# Patient Record
Sex: FEMALE | Race: BLACK OR AFRICAN AMERICAN | Employment: FULL TIME | URBAN - METROPOLITAN AREA
[De-identification: names, ages, dates, MRNs, and addresses within clinical notes are randomized per-mention and may not be internally consistent; named-entity substitution may affect disease eponyms.]

---

## 2017-01-09 PROBLEM — R73.03 PREDIABETES: Status: ACTIVE | Noted: 2017-01-09

## 2017-02-17 ENCOUNTER — HOSPITAL ENCOUNTER (OUTPATIENT)
Dept: ULTRASOUND IMAGING | Age: 37
Discharge: HOME | End: 2017-02-17
Attending: PHYSICIAN ASSISTANT
Payer: COMMERCIAL

## 2017-02-17 ENCOUNTER — HOSPITAL ENCOUNTER (OUTPATIENT)
Dept: MAMMOGRAPHY | Age: 37
Discharge: HOME | End: 2017-02-17
Attending: PHYSICIAN ASSISTANT
Payer: COMMERCIAL

## 2017-02-17 DIAGNOSIS — N64.4 BREAST PAIN: ICD-10-CM

## 2017-02-17 PROCEDURE — 76642 ULTRASOUND BREAST LIMITED: CPT

## 2017-02-17 PROCEDURE — 77062 BREAST TOMOSYNTHESIS BI: CPT

## 2017-11-20 ENCOUNTER — OFFICE VISIT (OUTPATIENT)
Dept: INTERNAL MEDICINE CLINIC | Age: 37
End: 2017-11-20

## 2017-11-20 VITALS
OXYGEN SATURATION: 99 % | SYSTOLIC BLOOD PRESSURE: 124 MMHG | HEIGHT: 66 IN | WEIGHT: 139.4 LBS | HEART RATE: 81 BPM | BODY MASS INDEX: 22.4 KG/M2 | DIASTOLIC BLOOD PRESSURE: 82 MMHG | RESPIRATION RATE: 14 BRPM | TEMPERATURE: 98.1 F

## 2017-11-20 DIAGNOSIS — H92.01 EAR PAIN, REFERRED, RIGHT: Primary | ICD-10-CM

## 2017-11-20 RX ORDER — CETIRIZINE HCL 10 MG
TABLET ORAL
COMMUNITY

## 2017-11-20 RX ORDER — AZITHROMYCIN 250 MG/1
TABLET, FILM COATED ORAL
Qty: 6 TAB | Refills: 0 | Status: CANCELLED | OUTPATIENT
Start: 2017-11-20 | End: 2017-11-25

## 2017-11-20 NOTE — PROGRESS NOTES
1. Have you been to the ER, urgent care clinic or hospitalized since your last visit? NO.     2. Have you seen or consulted any other health care providers outside of the [address] [place] [address] since your last visit (Include any pap smears or colon screening)? NO      Do you have an Advanced Directive? NO    Would you like information on Advanced Directives?  NO

## 2017-11-20 NOTE — PROGRESS NOTES
Tanvir Edgar is a 30 y.o.  female and presents with    Chief Complaint   Patient presents with    Head Pain     Patient here for head pain and right ear pain. Patient stated that it feels like something is in her ear. x 2 weeks for ear and head pain x 1 day        Subjective:  HPI   Mrs. Alejandro Monsalve presents today with complaints of right sided ear pain with intermittent headaches that seem worse today. Feels as though something is in the ear. She has tried Debrox but felt the ear pain was worse so she stopped. She has been taking Zyrtec daily, she did not take it over the weekend. Additional Concerns: none     ROS   Review of Systems   Constitutional: Negative for chills, fever and malaise/fatigue. HENT: Positive for ear pain. Negative for congestion, ear discharge, hearing loss, sinus pain, sore throat and tinnitus. Eyes: Negative. Respiratory: Negative. Cardiovascular: Negative. Gastrointestinal: Negative. Skin: Negative for itching and rash. Neurological: Positive for headaches. Negative for dizziness. Allergies   Allergen Reactions    Pcn [Penicillins] Anaphylaxis       Current Outpatient Prescriptions   Medication Sig Dispense Refill    cetirizine (ZYRTEC) 10 mg tablet Take  by mouth. Social History     Social History    Marital status:      Spouse name: N/A    Number of children: N/A    Years of education: N/A     Occupational History    Not on file. Social History Main Topics    Smoking status: Never Smoker    Smokeless tobacco: Never Used    Alcohol use Not on file    Drug use: Not on file    Sexual activity: Not on file     Other Topics Concern    Not on file     Social History Narrative       No past medical history on file. No past surgical history on file. No family history on file.     Objective:  Vitals:    11/20/17 1447   BP: 124/82   Pulse: 81   Resp: 14   Temp: 98.1 °F (36.7 °C)   TempSrc: Oral   SpO2: 99%   Weight: 139 lb 6.4 oz (63.2 kg)   Height: 5' 6\" (1.676 m)   PainSc:   4   PainLoc: Ear   LMP: 11/16/2017       LABS   Results for orders placed or performed during the hospital encounter of 11/11/15   HEP B SURFACE AB   Result Value Ref Range    Hep B surface Ab Interp. DETECTED (A) POS      Hep B surface Ab comment        Samples with a  value of 10 mIU/mL or greater are considered positive (protective immunity) in accordance with the CDC guidelines. MUMPS AB, IGG   Result Value Ref Range    Mumps Abs, IgG 173.0 Immune >10.9 AU/mL   RUBELLA AB, IGG   Result Value Ref Range    Rubella IgG, QL IMMUNE IMM     RUBEOLA AB, IGG   Result Value Ref Range    Rubeola Ab, IgG 138.0 Immune >29.9 AU/mL   VZV AB, IGG   Result Value Ref Range    V. zoster, IgG 1751 Immune >165 index       TESTS  none    PE  Physical Exam   Constitutional: She is oriented to person, place, and time. She appears well-developed and well-nourished. No distress. HENT:   Head: Normocephalic and atraumatic. Nose: Nose normal.   Mouth/Throat: Oropharynx is clear and moist. No oropharyngeal exudate. After ear lavage TM right ear visible with dark fluid behind the ear, TM left ear visible with mild erythema to external canal, non infectious   Eyes: Conjunctivae and EOM are normal. Pupils are equal, round, and reactive to light. Neck: Normal range of motion. Cardiovascular: Normal heart sounds. Lymphadenopathy:     She has no cervical adenopathy. Neurological: She is alert and oriented to person, place, and time. Skin: Skin is warm and dry. She is not diaphoretic. Psychiatric: She has a normal mood and affect. Her behavior is normal. Judgment and thought content normal.       Assessment/Plan:    1. Right ear pain- Cerumen impaction- ear lavage completed with relief, pt reports improved hearing. Noninfectious dark fluid noted behind the right TM, Zyrtec daily to BID, Flonase before sleep. Follow up as needed.      Lab review: no lab studies available for review at time of visit    Today's Visit:   Diagnoses and all orders for this visit:    1. Ear pain, referred, right      Health Maintenance: not addressed today    I have discussed the diagnosis with the patient and the intended plan as seen in the above orders. The patient has received an after-visit summary and questions were answered concerning future plans. I have discussed medication side effects and warnings with the patient as well. I have reviewed the plan of care with the patient, accepted their input and they are in agreement with the treatment goals. Follow-up Disposition: Not on File   More than 1/2 of this 15 minute visit was spent in counseling and coordination of care, as described above.     LEWIS Cordero  Internist of [address], [facility].  Phone: [phone]  Fax: [phone]

## 2017-12-07 ENCOUNTER — TELEPHONE (OUTPATIENT)
Dept: INTERNAL MEDICINE CLINIC | Age: 37
End: 2017-12-07

## 2017-12-07 ENCOUNTER — OFFICE VISIT (OUTPATIENT)
Dept: INTERNAL MEDICINE CLINIC | Age: 37
End: 2017-12-07

## 2017-12-07 ENCOUNTER — HOSPITAL ENCOUNTER (OUTPATIENT)
Dept: LAB | Age: 37
Discharge: HOME | End: 2017-12-07
Payer: COMMERCIAL

## 2017-12-07 VITALS
OXYGEN SATURATION: 99 % | RESPIRATION RATE: 14 BRPM | HEIGHT: 66 IN | SYSTOLIC BLOOD PRESSURE: 132 MMHG | TEMPERATURE: 98 F | BODY MASS INDEX: 22.63 KG/M2 | WEIGHT: 140.8 LBS | HEART RATE: 100 BPM | DIASTOLIC BLOOD PRESSURE: 70 MMHG

## 2017-12-07 DIAGNOSIS — Z86.59 HISTORY OF PANIC ATTACKS: ICD-10-CM

## 2017-12-07 DIAGNOSIS — Z86.39 HISTORY OF VITAMIN D DEFICIENCY: ICD-10-CM

## 2017-12-07 DIAGNOSIS — Z23 ENCOUNTER FOR IMMUNIZATION: ICD-10-CM

## 2017-12-07 DIAGNOSIS — Z00.00 ROUTINE GENERAL MEDICAL EXAMINATION AT A HEALTH CARE FACILITY: ICD-10-CM

## 2017-12-07 DIAGNOSIS — F41.1 GENERALIZED ANXIETY DISORDER: Primary | ICD-10-CM

## 2017-12-07 LAB
ALBUMIN SERPL-MCNC: 4 G/DL (ref 3.4–5)
ALBUMIN/GLOB SERPL: 1 {RATIO} (ref 0.8–1.7)
ALP SERPL-CCNC: 66 U/L (ref 45–117)
ALT SERPL-CCNC: 16 U/L (ref 13–56)
ANION GAP SERPL CALC-SCNC: 6 MMOL/L (ref 3–18)
APPEARANCE UR: CLEAR
AST SERPL-CCNC: 10 U/L (ref 15–37)
BACTERIA URNS QL MICRO: ABNORMAL /HPF
BASOPHILS # BLD: 0 K/UL (ref 0–0.06)
BASOPHILS NFR BLD: 0 % (ref 0–2)
BILIRUB SERPL-MCNC: 0.6 MG/DL (ref 0.2–1)
BILIRUB UR QL: NEGATIVE
BUN SERPL-MCNC: 7 MG/DL (ref 7–18)
BUN/CREAT SERPL: 8 (ref 12–20)
CALCIUM SERPL-MCNC: 8.9 MG/DL (ref 8.5–10.1)
CHLORIDE SERPL-SCNC: 105 MMOL/L (ref 100–108)
CO2 SERPL-SCNC: 27 MMOL/L (ref 21–32)
COLOR UR: YELLOW
CREAT SERPL-MCNC: 0.87 MG/DL (ref 0.6–1.3)
DIFFERENTIAL METHOD BLD: NORMAL
EOSINOPHIL # BLD: 0.1 K/UL (ref 0–0.4)
EOSINOPHIL NFR BLD: 1 % (ref 0–5)
EPITH CASTS URNS QL MICRO: ABNORMAL /LPF (ref 0–5)
ERYTHROCYTE [DISTWIDTH] IN BLOOD BY AUTOMATED COUNT: 13.4 % (ref 11.6–14.5)
GLOBULIN SER CALC-MCNC: 3.9 G/DL (ref 2–4)
GLUCOSE SERPL-MCNC: 88 MG/DL (ref 74–99)
GLUCOSE UR STRIP.AUTO-MCNC: NEGATIVE MG/DL
HCT VFR BLD AUTO: 39.8 % (ref 35–45)
HGB BLD-MCNC: 12.9 G/DL (ref 12–16)
HGB UR QL STRIP: NEGATIVE
KETONES UR QL STRIP.AUTO: NEGATIVE MG/DL
LEUKOCYTE ESTERASE UR QL STRIP.AUTO: ABNORMAL
LYMPHOCYTES # BLD: 1.8 K/UL (ref 0.9–3.6)
LYMPHOCYTES NFR BLD: 21 % (ref 21–52)
MCH RBC QN AUTO: 25.9 PG (ref 24–34)
MCHC RBC AUTO-ENTMCNC: 32.4 G/DL (ref 31–37)
MCV RBC AUTO: 79.8 FL (ref 74–97)
MONOCYTES # BLD: 0.6 K/UL (ref 0.05–1.2)
MONOCYTES NFR BLD: 6 % (ref 3–10)
NEUTS SEG # BLD: 6.4 K/UL (ref 1.8–8)
NEUTS SEG NFR BLD: 72 % (ref 40–73)
NITRITE UR QL STRIP.AUTO: NEGATIVE
PH UR STRIP: 6 [PH] (ref 5–8)
PLATELET # BLD AUTO: 265 K/UL (ref 135–420)
PMV BLD AUTO: 11.6 FL (ref 9.2–11.8)
POTASSIUM SERPL-SCNC: 4.1 MMOL/L (ref 3.5–5.5)
PROT SERPL-MCNC: 7.9 G/DL (ref 6.4–8.2)
PROT UR STRIP-MCNC: NEGATIVE MG/DL
RBC # BLD AUTO: 4.99 M/UL (ref 4.2–5.3)
RBC #/AREA URNS HPF: ABNORMAL /HPF (ref 0–5)
SODIUM SERPL-SCNC: 138 MMOL/L (ref 136–145)
SP GR UR REFRACTOMETRY: 1.01 (ref 1–1.03)
TSH SERPL DL<=0.05 MIU/L-ACNC: 0.91 UIU/ML (ref 0.36–3.74)
UROBILINOGEN UR QL STRIP.AUTO: 0.2 EU/DL (ref 0.2–1)
WBC # BLD AUTO: 8.9 K/UL (ref 4.6–13.2)
WBC URNS QL MICRO: ABNORMAL /HPF (ref 0–4)

## 2017-12-07 PROCEDURE — 80053 COMPREHEN METABOLIC PANEL: CPT | Performed by: NURSE PRACTITIONER

## 2017-12-07 PROCEDURE — 81001 URINALYSIS AUTO W/SCOPE: CPT | Performed by: NURSE PRACTITIONER

## 2017-12-07 PROCEDURE — 84443 ASSAY THYROID STIM HORMONE: CPT | Performed by: NURSE PRACTITIONER

## 2017-12-07 PROCEDURE — 85025 COMPLETE CBC W/AUTO DIFF WBC: CPT | Performed by: NURSE PRACTITIONER

## 2017-12-07 PROCEDURE — 82306 VITAMIN D 25 HYDROXY: CPT | Performed by: NURSE PRACTITIONER

## 2017-12-07 RX ORDER — ALPRAZOLAM 0.25 MG/1
TABLET ORAL
COMMUNITY
End: 2017-12-07 | Stop reason: SDUPTHER

## 2017-12-07 RX ORDER — ALPRAZOLAM 0.25 MG/1
0.25 TABLET ORAL
Qty: 30 TAB | Refills: 0 | OUTPATIENT
Start: 2017-12-07 | End: 2019-06-05 | Stop reason: SDUPTHER

## 2017-12-07 NOTE — PATIENT INSTRUCTIONS
Fluticasone (Into the nose)   Fluticasone ([id number])  Treats allergy symptoms, such as runny or stuffy nose. This medicine is a corticosteroid. Brand Name(s): Children's Flonase, ClariSpray, DermacinRx TrustedAd, DermacinRx [place], [facility], Flonase Sensimist, Fluticasone Propionate Novaplus, Ticaspray, Veramyst   There may be other brand names for this medicine. When This Medicine Should Not Be Used: This medicine is not right for everyone. Do not use if you had an allergic reaction to fluticasone. How to Use This Medicine:   Spray  · Your doctor will tell you how much medicine to use. Do not use more than directed. · This medicine is for use only in the nose. Do not get any of it in your eyes or on your skin. If it does get on these areas, rinse it off right away. · Prime the spray: Release 6 test sprays into the air away from the face, or pump the bottle until some of the medicine sprays out. Now it is ready to use. Prime the spray if it has not been used for more than 7 days (or 30 days for Veramyst®) or if the cap has been left off the bottle for 5 days or longer. · Shake the medicine well just before each use. · Before using the medicine, gently blow your nose to clear the nostrils. · After using the nasal spray, wipe the tip of the bottle with a clean tissue and put the cap back on. · You may need to use this medicine for a few days before you start to feel better. · Read and follow the patient instructions that come with this medicine. Talk to your doctor or pharmacist if you have any questions. · Follow the instructions on the medicine label if you are using this medicine without a prescription. · Missed dose: Take a dose as soon as you remember. If it is almost time for your next dose, wait until then and take a regular dose. Do not take extra medicine to make up for a missed dose. · Keep the bottle tightly closed when not using it.  Store at room temperature, away from heat and direct light. Do not freeze or refrigerate. Throw this medicine away after you use 120 sprays. Drugs and Foods to Avoid:   Ask your doctor or pharmacist before using any other medicine, including over-the-counter medicines, vitamins, and herbal products. · Do not use this medicine together with ritonavir. · Some foods and medicines can affect how fluticasone works. Tell your doctor if you are using ketoconazole. Warnings While Using This Medicine:   · Tell your doctor if you are pregnant or breastfeeding, or if you have liver disease, asthma, an infection, or a history of cataracts or glaucoma. Make sure your doctor knows if you have had nose surgery, a nose injury, or a recent infection in your nose. · This medicine may cause the following problems:  ¨ Holes or ulcers inside the nose  ¨ Slow wound healing  ¨ Cataracts or glaucoma  ¨ Problems with the adrenal glands  ¨ Slow growth in children  · Avoid people who are sick or have infections. Tell your doctor right away if you think you have been exposed to measles or chickenpox. · Your doctor will check your progress and the effects of this medicine at regular visits. Keep all appointments. · Call your doctor if your symptoms do not improve or if they get worse. · Keep all medicine out of the reach of children. Never share your medicine with anyone.   Possible Side Effects While Using This Medicine:   Call your doctor right away if you notice any of these side effects:  · Allergic reaction: Itching or hives, swelling in your face or hands, swelling or tingling in your mouth or throat, chest tightness, trouble breathing  · Burning, redness, swelling, or irritation around or inside your nose  · Eye pain or vision changes  · Fever, chills, cough, sore throat, and body aches  · Heavy nosebleeds  · Sores or white patches inside the nose or mouth  · Tiredness, weakness, dizziness  If you notice other side effects that you think are caused by this medicine, tell your doctor. Call your doctor for medical advice about side effects. You may report side effects to FDA at 4-584-FDA-6229  © 2017 [address] [employer] Information is for End User's use only and may not be sold, redistributed or otherwise used for commercial purposes. The above information is an  only. It is not intended as medical advice for individual conditions or treatments. Talk to your doctor, nurse or pharmacist before following any medical regimen to see if it is safe and effective for you. Anxiety Disorder: Care Instructions  Your Care Instructions    Anxiety is a normal reaction to stress. Difficult situations can cause you to have symptoms such as sweaty palms and a nervous feeling. In an anxiety disorder, the symptoms are far more severe. Constant worry, muscle tension, trouble sleeping, nausea and diarrhea, and other symptoms can make normal daily activities difficult or impossible. These symptoms may occur for no reason, and they can affect your work, school, or social life. Medicines, counseling, and self-care can all help. Follow-up care is a key part of your treatment and safety. Be sure to make and go to all appointments, and call your doctor if you are having problems. It's also a good idea to know your test results and keep a list of the medicines you take. How can you care for yourself at home? · Take medicines exactly as directed. Call your doctor if you think you are having a problem with your medicine. · Go to your counseling sessions and follow-up appointments. · Recognize and accept your anxiety. Then, when you are in a situation that makes you anxious, say to yourself, \"This is not an emergency. I feel uncomfortable, but I am not in danger. I can keep going even if I feel anxious. \"  · Be kind to your body:  ¨ Relieve tension with exercise or a massage. ¨ Get enough rest.  ¨ Avoid alcohol, caffeine, nicotine, and illegal drugs.  They can increase your anxiety level and cause sleep problems. ¨ Learn and do relaxation techniques. See below for more about these techniques. · Engage your mind. Get out and do something you enjoy. Go to a funny movie, or take a walk or hike. Plan your day. Having too much or too little to do can make you anxious. · Keep a record of your symptoms. Discuss your fears with a good friend or family member, or join a support group for people with similar problems. Talking to others sometimes relieves stress. · Get involved in social groups, or volunteer to help others. Being alone sometimes makes things seem worse than they are. · Get at least 30 minutes of exercise on most days of the week to relieve stress. Walking is a good choice. You also may want to do other activities, such as running, swimming, cycling, or playing tennis or team sports. Relaxation techniques  Do relaxation exercises 10 to 20 minutes a day. You can play soothing, relaxing music while you do them, if you wish. · Tell others in your house that you are going to do your relaxation exercises. Ask them not to disturb you. · Find a comfortable place, away from all distractions and noise. · Lie down on your back, or sit with your back straight. · Focus on your breathing. Make it slow and steady. · Breathe in through your nose. Breathe out through either your nose or mouth. · Breathe deeply, filling up the area between your navel and your rib cage. Breathe so that your belly goes up and down. · Do not hold your breath. · Breathe like this for 5 to 10 minutes. Notice the feeling of calmness throughout your whole body. As you continue to breathe slowly and deeply, relax by doing the following for another 5 to 10 minutes:  · Tighten and relax each muscle group in your body. You can begin at your toes and work your way up to your head. · Imagine your muscle groups relaxing and becoming heavy. · Empty your mind of all thoughts.   · Let yourself relax more and more deeply. · Become aware of the state of calmness that surrounds you. · When your relaxation time is over, you can bring yourself back to alertness by moving your fingers and toes and then your hands and feet and then stretching and moving your entire body. Sometimes people fall asleep during relaxation, but they usually wake up shortly afterward. · Always give yourself time to return to full alertness before you drive a car or do anything that might cause an accident if you are not fully alert. Never play a relaxation tape while you drive a car. When should you call for help? Call 911 anytime you think you may need emergency care. For example, call if:  ? · You feel you cannot stop from hurting yourself or someone else. ? Keep the numbers for these national suicide hotlines: 0-137-139-TALK (4-716.482.5195) and 1-377-JUBHYOJ (4-566.564.6629). If you or someone you know talks about suicide or feeling hopeless, get help right away. ? Watch closely for changes in your health, and be sure to contact your doctor if:  ? · You have anxiety or fear that affects your life. ? · You have symptoms of anxiety that are new or different from those you had before. Where can you learn more? Go to http://carlos manuel-joan.info/. Enter P754 in the search box to learn more about \"Anxiety Disorder: Care Instructions. \"  Current as of: May 12, 2017  Content Version: 11.4  © 1610-9674 [employer]. Care instructions adapted under license by [employer] (which disclaims liability or warranty for this information). If you have questions about a medical condition or this instruction, always ask your healthcare professional. [place] [address] any warranty or liability for your use of this information.

## 2017-12-07 NOTE — PROGRESS NOTES
Ondina Ross is a 30 y.o.  female and presents with    Chief Complaint   Patient presents with    Physical     Patient here for physical        Subjective:  HPI   Mrs. Rayray Collier presents today to have a CPE and get a refill on her Xanax. History of anxiety. Anxiety  She is established with Psychology. Did not tolerate Lexapro and Zoloft in past, increased symptoms of anxiety. She needs a refill on Xanax today. She reports today recent personal life stressors. Elevated blood pressure  Significant FMH. May be related to anxiety. Additional Concerns: none     ROS   Review of Systems   Constitutional: Negative. HENT: Negative. Eyes: Negative. Respiratory: Negative. Cardiovascular: Negative. Gastrointestinal: Negative. Genitourinary: Negative. Musculoskeletal: Negative. Skin: Negative. Neurological: Negative. Endo/Heme/Allergies: Negative. Psychiatric/Behavioral: The patient is nervous/anxious. Allergies   Allergen Reactions    Pcn [Penicillins] Anaphylaxis       Current Outpatient Prescriptions   Medication Sig Dispense Refill    ALPRAZolam (XANAX) 0.25 mg tablet Take 1 Tab by mouth every eight (8) hours as needed for Anxiety. Max Daily Amount: 0.75 mg. 30 Tab 0    cetirizine (ZYRTEC) 10 mg tablet Take  by mouth. Social History     Social History    Marital status:      Spouse name: N/A    Number of children: N/A    Years of education: N/A     Occupational History    Not on file. Social History Main Topics    Smoking status: Never Smoker    Smokeless tobacco: Never Used    Alcohol use Yes      Comment: socially    Drug use: No    Sexual activity: Yes     Partners: Male     Birth control/ protection: None     Other Topics Concern    Not on file     Social History Narrative       History reviewed. No pertinent past medical history. History reviewed. No pertinent surgical history.     Family History   Problem Relation Age of Onset    Elevated Lipids Mother     Hypertension Mother     Heart Disease Father     Heart Disease Paternal Uncle     Diabetes Maternal Grandmother     Elevated Lipids Maternal Grandmother     Hypertension Maternal Grandmother     Diabetes Maternal Grandfather     Elevated Lipids Maternal Grandfather     Hypertension Maternal Grandfather     Heart Disease Maternal Grandfather        Objective:  Vitals:    12/07/17 1259   BP: 132/70   Pulse: 100   Resp: 14   Temp: 98 °F (36.7 °C)   TempSrc: Oral   SpO2: 99%   Weight: 140 lb 12.8 oz (63.9 kg)   Height: 5' 6\" (1.676 m)   PainSc:   0 - No pain   LMP: 11/16/2017       LABS   Results for orders placed or performed during the hospital encounter of 11/11/15   HEP B SURFACE AB   Result Value Ref Range    Hep B surface Ab Interp. DETECTED (A) POS      Hep B surface Ab comment        Samples with a  value of 10 mIU/mL or greater are considered positive (protective immunity) in accordance with the CDC guidelines. MUMPS AB, IGG   Result Value Ref Range    Mumps Abs, IgG 173.0 Immune >10.9 AU/mL   RUBELLA AB, IGG   Result Value Ref Range    Rubella IgG, QL IMMUNE IMM     RUBEOLA AB, IGG   Result Value Ref Range    Rubeola Ab, IgG 138.0 Immune >29.9 AU/mL   VZV AB, IGG   Result Value Ref Range    V. zoster, IgG 1751 Immune >165 index       TESTS  none    PE  Physical Exam   Constitutional: She is oriented to person, place, and time. She appears well-developed and well-nourished. No distress. HENT:   Head: Normocephalic and atraumatic. Right Ear: External ear normal.   Left Ear: External ear normal.   Nose: Nose normal.   Mouth/Throat: Oropharynx is clear and moist.   Eyes: Conjunctivae and EOM are normal. Pupils are equal, round, and reactive to light. Neck: Normal range of motion. Neck supple. No thyromegaly present. Cardiovascular: Normal rate, regular rhythm, normal heart sounds and intact distal pulses.     Pulmonary/Chest: Effort normal and breath sounds normal. No respiratory distress. She has no wheezes. She has no rales. She exhibits no tenderness. Abdominal: Soft. Bowel sounds are normal. She exhibits no distension. There is no tenderness. Musculoskeletal: Normal range of motion. Lymphadenopathy:     She has no cervical adenopathy. Neurological: She is alert and oriented to person, place, and time. No cranial nerve deficit. Skin: Skin is warm and dry. No rash noted. She is not diaphoretic. No erythema. No pallor. Psychiatric: She has a normal mood and affect. Her behavior is normal. Judgment and thought content normal.       Assessment/Plan:    1. CPE- completed today. Labs ordered. Will send to PCP. 2. Anxiety- Reported history of anxiety. DANIEL-7, PHQ-9-5, scanned into chart. Refilled Xanax script, phoned in. Follow up with PCP. Established with Psychology, has not had recent contact. 3. Elevated blood pressure- Significant FMH. Discussed new guidelines. Monitor salt intake. Patient is attempting healthy diet by making better food choices. She is not currently on an exercise regimen. Lab review: orders written for new lab studies as appropriate; see orders    Today's Visit:   Diagnoses and all orders for this visit:    1. Generalized anxiety disorder  -     ALPRAZolam (XANAX) 0.25 mg tablet; Take 1 Tab by mouth every eight (8) hours as needed for Anxiety. Max Daily Amount: 0.75 mg.    2. History of panic attacks  -     ALPRAZolam (XANAX) 0.25 mg tablet; Take 1 Tab by mouth every eight (8) hours as needed for Anxiety. Max Daily Amount: 0.75 mg.    3. Routine general medical examination at a health care facility  -     CBC WITH AUTOMATED DIFF; Future  -     METABOLIC PANEL, COMPREHENSIVE; Future  -     TSH 3RD GENERATION; Future  -     URINALYSIS W/MICROSCOPIC; Future    4. History of vitamin D deficiency  -     VITAMIN D, 25 HYDROXY; Future    5.  Encounter for immunization  -     Tetanus, diphtheria toxoids and acellular pertussis (TDAP) vaccine, in individuals >=7 years, IM      Health Maintenance:   Influenza up to date. Tdap vaccine given today. Pap- she is followed by Gynecologist    I have discussed the diagnosis with the patient and the intended plan as seen in the above orders. The patient has received an after-visit summary and questions were answered concerning future plans. I have discussed medication side effects and warnings with the patient as well. I have reviewed the plan of care with the patient, accepted their input and they are in agreement with the treatment goals. Follow-up Disposition:  Return if symptoms worsen or fail to improve. More than 1/2 of this 45 minute visit was spent in counseling and coordination of care, as described above.     LEWIS Caputo  Internist of [address], [address] [place] [address].  Phone: [phone]  Fax: [phone]

## 2017-12-07 NOTE — MR AVS SNAPSHOT
Visit Information Date & Time Provider Department Dept. Phone Encounter #  
 12/7/2017  1:00 PM Cassy Zimmer NP [facility] [phone] Follow-up Instructions Return if symptoms worsen or fail to improve. Upcoming Health Maintenance Date Due  
 PAP AKA CERVICAL CYTOLOGY 5/1/2019 DTaP/Tdap/Td series (2 - Td) 12/7/2027 Allergies as of 12/7/2017  Review Complete On: 12/7/2017 By: Cassy Zimmer NP Severity Noted Reaction Type Reactions Pcn [Penicillins] High 05/12/2016    Anaphylaxis Current Immunizations  Never Reviewed Name Date Tdap 12/7/2017 Not reviewed this visit You Were Diagnosed With   
  
 Codes Comments Generalized anxiety disorder    -  Primary ICD-10-CM: F41.1 ICD-9-CM: 300.02 History of panic attacks     ICD-10-CM: Z86.59 
ICD-9-CM: V11.8 Routine general medical examination at a health care facility     ICD-10-CM: Z00.00 ICD-9-CM: V70.0 History of vitamin D deficiency     ICD-10-CM: Z86.39 
ICD-9-CM: V12.1 Encounter for immunization     ICD-10-CM: S84 ICD-9-CM: V03.89 Vitals BP Pulse Temp Resp Height(growth percentile) Weight(growth percentile) 132/70 (BP 1 Location: Right arm, BP Patient Position: Sitting) 100 98 °F (36.7 °C) (Oral) 14 5' 6\" (1.676 m) 140 lb 12.8 oz (63.9 kg) LMP SpO2 BMI Smoking Status 11/16/2017 99% 22.73 kg/m2 Never Smoker Vitals History BMI and BSA Data Body Mass Index Body Surface Area  
 22.73 kg/m 2 1.72 m 2 [facility] [address] [facility] Your Updated Medication List  
  
   
This list is accurate as of: 12/7/17  2:17 PM.  Always use your most recent med list.  
  
  
  
  
 ALPRAZolam 0.25 mg tablet Commonly known as:  Veronica Chito Take 1 Tab by mouth every eight (8) hours as needed for Anxiety.  Max Daily Amount: 0.75 mg.  
  
 ZyrTEC 10 mg tablet Generic drug:  cetirizine Take  by mouth. We Performed the Following TETANUS, DIPHTHERIA TOXOIDS AND ACELLULAR PERTUSSIS VACCINE (TDAP), IN INDIVIDS. >=7, IM [id number] CPT(R)] Follow-up Instructions Return if symptoms worsen or fail to improve. To-Do List   
 12/07/2017 Lab:  CBC WITH AUTOMATED DIFF   
  
 12/07/2017 Lab:  METABOLIC PANEL, COMPREHENSIVE   
  
 12/07/2017 Lab:  TSH 3RD GENERATION   
  
 12/07/2017 Lab:  URINALYSIS W/MICROSCOPIC   
  
 12/07/2017 Lab:  VITAMIN D, 25 HYDROXY Patient Instructions Fluticasone (Into the nose) Fluticasone (zerl-KGF-j-sone) Treats allergy symptoms, such as runny or stuffy nose. This medicine is a corticosteroid. Brand Name(s): Children's Flonase, ClariSpray, DermacinRx [facility], DermacinRx [place], [facility], Flonase Sensimist, Fluticasone Propionate Novaplus, Ticaspray, Veramyst  
There may be other brand names for this medicine. When This Medicine Should Not Be Used: This medicine is not right for everyone. Do not use if you had an allergic reaction to fluticasone. How to Use This Medicine:  
Spray · Your doctor will tell you how much medicine to use. Do not use more than directed. · This medicine is for use only in the nose. Do not get any of it in your eyes or on your skin. If it does get on these areas, rinse it off right away. · Prime the spray: Release 6 test sprays into the air away from the face, or pump the bottle until some of the medicine sprays out. Now it is ready to use. Prime the spray if it has not been used for more than 7 days (or 30 days for Veramyst®) or if the cap has been left off the bottle for 5 days or longer. · Shake the medicine well just before each use. · Before using the medicine, gently blow your nose to clear the nostrils.  
· After using the nasal spray, wipe the tip of the bottle with a clean tissue and put the cap back on. · You may need to use this medicine for a few days before you start to feel better. · Read and follow the patient instructions that come with this medicine. Talk to your doctor or pharmacist if you have any questions. · Follow the instructions on the medicine label if you are using this medicine without a prescription. · Missed dose: Take a dose as soon as you remember. If it is almost time for your next dose, wait until then and take a regular dose. Do not take extra medicine to make up for a missed dose. · Keep the bottle tightly closed when not using it. Store at room temperature, away from heat and direct light. Do not freeze or refrigerate. Throw this medicine away after you use 120 sprays. Drugs and Foods to Avoid: Ask your doctor or pharmacist before using any other medicine, including over-the-counter medicines, vitamins, and herbal products. · Do not use this medicine together with ritonavir. · Some foods and medicines can affect how fluticasone works. Tell your doctor if you are using ketoconazole. Warnings While Using This Medicine: · Tell your doctor if you are pregnant or breastfeeding, or if you have liver disease, asthma, an infection, or a history of cataracts or glaucoma. Make sure your doctor knows if you have had nose surgery, a nose injury, or a recent infection in your nose. · This medicine may cause the following problems: 
¨ Holes or ulcers inside the nose ¨ Slow wound healing ¨ Cataracts or glaucoma ¨ Problems with the adrenal glands ¨ Slow growth in children · Avoid people who are sick or have infections. Tell your doctor right away if you think you have been exposed to measles or chickenpox. · Your doctor will check your progress and the effects of this medicine at regular visits. Keep all appointments. · Call your doctor if your symptoms do not improve or if they get worse. · Keep all medicine out of the reach of children. Never share your medicine with anyone. Possible Side Effects While Using This Medicine:  
Call your doctor right away if you notice any of these side effects: · Allergic reaction: Itching or hives, swelling in your face or hands, swelling or tingling in your mouth or throat, chest tightness, trouble breathing · Burning, redness, swelling, or irritation around or inside your nose · Eye pain or vision changes · Fever, chills, cough, sore throat, and body aches · Heavy nosebleeds · Sores or white patches inside the nose or mouth · Tiredness, weakness, dizziness If you notice other side effects that you think are caused by this medicine, tell your doctor. Call your doctor for medical advice about side effects. You may report side effects to FDA at 0-546-FDA-0282 © 2017 [address] Information is for End User's use only and may not be sold, redistributed or otherwise used for commercial purposes. The above information is an  only. It is not intended as medical advice for individual conditions or treatments. Talk to your doctor, nurse or pharmacist before following any medical regimen to see if it is safe and effective for you. Anxiety Disorder: Care Instructions Your Care Instructions Anxiety is a normal reaction to stress. Difficult situations can cause you to have symptoms such as sweaty palms and a nervous feeling. In an anxiety disorder, the symptoms are far more severe. Constant worry, muscle tension, trouble sleeping, nausea and diarrhea, and other symptoms can make normal daily activities difficult or impossible. These symptoms may occur for no reason, and they can affect your work, school, or social life. Medicines, counseling, and self-care can all help. Follow-up care is a key part of your treatment and safety.  Be sure to make and go to all appointments, and call your doctor if you are having problems. It's also a good idea to know your test results and keep a list of the medicines you take. How can you care for yourself at home? · Take medicines exactly as directed. Call your doctor if you think you are having a problem with your medicine. · Go to your counseling sessions and follow-up appointments. · Recognize and accept your anxiety. Then, when you are in a situation that makes you anxious, say to yourself, \"This is not an emergency. I feel uncomfortable, but I am not in danger. I can keep going even if I feel anxious. \" · Be kind to your body: ¨ Relieve tension with exercise or a massage. ¨ Get enough rest. 
¨ Avoid alcohol, caffeine, nicotine, and illegal drugs. They can increase your anxiety level and cause sleep problems. ¨ Learn and do relaxation techniques. See below for more about these techniques. · Engage your mind. Get out and do something you enjoy. Go to a funny movie, or take a walk or hike. Plan your day. Having too much or too little to do can make you anxious. · Keep a record of your symptoms. Discuss your fears with a good friend or family member, or join a support group for people with similar problems. Talking to others sometimes relieves stress. · Get involved in social groups, or volunteer to help others. Being alone sometimes makes things seem worse than they are. · Get at least 30 minutes of exercise on most days of the week to relieve stress. Walking is a good choice. You also may want to do other activities, such as running, swimming, cycling, or playing tennis or team sports. Relaxation techniques Do relaxation exercises 10 to 20 minutes a day. You can play soothing, relaxing music while you do them, if you wish. · Tell others in your house that you are going to do your relaxation exercises. Ask them not to disturb you. · Find a comfortable place, away from all distractions and noise. · Lie down on your back, or sit with your back straight. · Focus on your breathing. Make it slow and steady. · Breathe in through your nose. Breathe out through either your nose or mouth. · Breathe deeply, filling up the area between your navel and your rib cage. Breathe so that your belly goes up and down. · Do not hold your breath. · Breathe like this for 5 to 10 minutes. Notice the feeling of calmness throughout your whole body. As you continue to breathe slowly and deeply, relax by doing the following for another 5 to 10 minutes: · Tighten and relax each muscle group in your body. You can begin at your toes and work your way up to your head. · Imagine your muscle groups relaxing and becoming heavy. · Empty your mind of all thoughts. · Let yourself relax more and more deeply. · Become aware of the state of calmness that surrounds you. · When your relaxation time is over, you can bring yourself back to alertness by moving your fingers and toes and then your hands and feet and then stretching and moving your entire body. Sometimes people fall asleep during relaxation, but they usually wake up shortly afterward. · Always give yourself time to return to full alertness before you drive a car or do anything that might cause an accident if you are not fully alert. Never play a relaxation tape while you drive a car. When should you call for help? Call 911 anytime you think you may need emergency care. For example, call if: 
? · You feel you cannot stop from hurting yourself or someone else. ? Keep the numbers for these national suicide hotlines: 7-953-194-TALK (6-628.154.7852) and 9-462-JRQDDBV (7-274.522.6819). If you or someone you know talks about suicide or feeling hopeless, get help right away. ? Watch closely for changes in your health, and be sure to contact your doctor if: 
? · You have anxiety or fear that affects your life. ? · You have symptoms of anxiety that are new or different from those you had before. Where can you learn more? Go to http://carlos manuel-joan.info/. Enter P754 in the search box to learn more about \"Anxiety Disorder: Care Instructions. \" Current as of: May 12, 2017 Content Version: 11.4 © 4059-7538 Healthwise, [employer]. Care instructions adapted under license by [employer] (which disclaims liability or warranty for this information). If you have questions about a medical condition or this instruction, always ask your healthcare professional. [address] any warranty or liability for your use of this information. Introducing [facility]! Javid Mi introduces [employer] patient portal. Now you can access parts of your medical record, email your doctor's office, and request medication refills online. 1. In your internet browser, go to [email or web address] 2. Click on the First Time User? Click Here link in the Sign In box. You will see the New Member Sign Up page. 3. Enter your [employer] Access Code exactly as it appears below. You will not need to use this code after youve completed the sign-up process. If you do not sign up before the expiration date, you must request a new code. · [employer] Access Code: [email or web address] Expires: 3/7/2018  2:17 PM 
 
4. Enter the last four digits of your Social Security Number (xxxx) and Date of Birth (mm/dd/yyyy) as indicated and click Submit. You will be taken to the next sign-up page. 5. Create a [employer] ID. This will be your [employer] login ID and cannot be changed, so think of one that is secure and easy to remember. 6. Create a [employer] password. You can change your password at any time. 7. Enter your Password Reset Question and Answer. This can be used at a later time if you forget your password. 8. Enter your e-mail address. You will receive e-mail notification when new information is available in [address] 9. Click Sign Up. You can now view and download portions of your medical record. 10. Click the Download Summary menu link to download a portable copy of your medical information. If you have questions, please visit the Frequently Asked Questions section of the [employer] website. Remember, [employer] is NOT to be used for urgent needs. For medical emergencies, dial 911. Now available from your iPhone and Android! Please provide this summary of care documentation to your next provider. Your primary care clinician is listed as Ray Sanabria. If you have any questions after today's visit, please call [phone].

## 2017-12-07 NOTE — PROGRESS NOTES
1. Have you been to the ER, urgent care clinic or hospitalized since your last visit? NO.     2. Have you seen or consulted any other health care providers outside of the [address] [place] [address] since your last visit (Include any pap smears or colon screening)? NO      Do you have an Advanced Directive? NO    Would you like information on Advanced Directives?  NO

## 2017-12-08 LAB — 25(OH)D3 SERPL-MCNC: 20.9 NG/ML (ref 30–100)

## 2018-03-01 ENCOUNTER — TELEPHONE (OUTPATIENT)
Dept: INTERNAL MEDICINE CLINIC | Age: 38
End: 2018-03-01

## 2018-03-01 RX ORDER — FLUCONAZOLE 150 MG/1
150 TABLET ORAL DAILY
Qty: 1 TAB | Refills: 1 | Status: SHIPPED | OUTPATIENT
Start: 2018-03-01 | End: 2018-03-02

## 2018-05-02 ENCOUNTER — TELEPHONE (OUTPATIENT)
Dept: INTERNAL MEDICINE CLINIC | Age: 38
End: 2018-05-02

## 2018-05-02 DIAGNOSIS — J30.2 SEASONAL ALLERGIC RHINITIS, UNSPECIFIED TRIGGER: Primary | ICD-10-CM

## 2018-05-02 RX ORDER — HYDROCODONE POLISTIREX AND CHLORPHENIRAMINE POLISTIREX 10; 8 MG/5ML; MG/5ML
5 SUSPENSION, EXTENDED RELEASE ORAL
Qty: 115 ML | Refills: 0 | Status: SHIPPED | OUTPATIENT
Start: 2018-05-02 | End: 2018-12-27 | Stop reason: ALTCHOICE

## 2018-05-02 NOTE — TELEPHONE ENCOUNTER
Patient reports continuous post nasal drip, costochondritis secondary to continuous cough, sore throat from coughing, cough worse at night. She has tried Zyrtec, Flonase, and nasal saline rinse daily with minimal relief. Requesting something to help with cough relief and improve sleep due to cough. Will provide script for Tussionex. Gave to patient.

## 2018-08-08 DIAGNOSIS — Z02.0 SCHOOL PHYSICAL EXAM: Primary | ICD-10-CM

## 2018-08-13 ENCOUNTER — CLINICAL SUPPORT (OUTPATIENT)
Dept: INTERNAL MEDICINE CLINIC | Age: 38
End: 2018-08-13

## 2018-08-13 ENCOUNTER — HOSPITAL ENCOUNTER (OUTPATIENT)
Dept: LAB | Age: 38
Discharge: HOME | End: 2018-08-13
Payer: COMMERCIAL

## 2018-08-13 DIAGNOSIS — Z11.1 SCREENING-PULMONARY TB: Primary | ICD-10-CM

## 2018-08-13 DIAGNOSIS — Z02.0 SCHOOL PHYSICAL EXAM: ICD-10-CM

## 2018-08-13 LAB — RUBV IGG SER-IMP: NORMAL

## 2018-08-13 PROCEDURE — 86706 HEP B SURFACE ANTIBODY: CPT | Performed by: NURSE PRACTITIONER

## 2018-08-13 PROCEDURE — 86735 MUMPS ANTIBODY: CPT | Performed by: NURSE PRACTITIONER

## 2018-08-13 PROCEDURE — 86787 VARICELLA-ZOSTER ANTIBODY: CPT | Performed by: NURSE PRACTITIONER

## 2018-08-13 PROCEDURE — 86765 RUBEOLA ANTIBODY: CPT | Performed by: NURSE PRACTITIONER

## 2018-08-13 PROCEDURE — 86762 RUBELLA ANTIBODY: CPT | Performed by: NURSE PRACTITIONER

## 2018-08-14 LAB
HBV SURFACE AB SER QL IA: POSITIVE
HBV SURFACE AB SERPL IA-ACNC: >1000 MIU/ML
HEP BS AB COMMENT,HBSAC: NORMAL

## 2018-08-15 ENCOUNTER — CLINICAL SUPPORT (OUTPATIENT)
Dept: INTERNAL MEDICINE CLINIC | Age: 38
End: 2018-08-15

## 2018-08-15 DIAGNOSIS — Z11.1 ENCOUNTER FOR PPD SKIN TEST READING: Primary | ICD-10-CM

## 2018-08-15 LAB
MEV IGG SER IA-ACNC: 159 AU/ML
MM INDURATION POC: 0 MM (ref 0–5)
MUV IGG SER IA-ACNC: 118 AU/ML
PPD POC: NEGATIVE NEGATIVE
VZV IGG SER IA-ACNC: 2309 INDEX

## 2018-08-15 NOTE — PROGRESS NOTES
PPD Placement note  Kala Shea, 41 y.o. female is here today for PPD read in right forearm  Reason for PPD test: school  Pt taken PPD test before: yes  Verified in allergy area and with patient that they are not allergic to the products PPD is made of (Phenol or Tween). No  Is patient taking any oral or IV steroid medication now or have they taken it in the last month? no  Has the patient ever received the BCG vaccine?: no  Has the patient been in recent contact with anyone known or suspected of having active TB disease?: no       Date of exposure (if applicable): N/A           Name of person they were exposed to (if applicable): N/A  Patient's Country of origin?: [facility]  O: Alert and oriented in NAD.

## 2018-08-16 ENCOUNTER — TELEPHONE (OUTPATIENT)
Dept: INTERNAL MEDICINE CLINIC | Age: 38
End: 2018-08-16

## 2018-09-17 ENCOUNTER — CLINICAL SUPPORT (OUTPATIENT)
Dept: INTERNAL MEDICINE CLINIC | Age: 38
End: 2018-09-17

## 2018-09-17 DIAGNOSIS — Z11.1 SCREENING FOR TUBERCULOSIS: Primary | ICD-10-CM

## 2018-09-19 ENCOUNTER — CLINICAL SUPPORT (OUTPATIENT)
Dept: INTERNAL MEDICINE CLINIC | Age: 38
End: 2018-09-19

## 2018-09-19 DIAGNOSIS — Z11.1 ENCOUNTER FOR PPD SKIN TEST READING: Primary | ICD-10-CM

## 2018-09-19 LAB
MM INDURATION POC: 0 MM (ref 0–5)
PPD POC: NEGATIVE NEGATIVE

## 2018-09-19 NOTE — PROGRESS NOTES
PPD Reading Note  PPD read and results entered in [address] [employer]. Result: 0 mm induration.   Interpretation: negative  If test not read within 48-72 hours of initial placement, patient advised to repeat in other arm 1-3 weeks after this test.  Allergic reaction: no

## 2018-09-27 ENCOUNTER — TELEPHONE (OUTPATIENT)
Dept: INTERNAL MEDICINE CLINIC | Age: 38
End: 2018-09-27

## 2018-09-27 RX ORDER — MONTELUKAST SODIUM 10 MG/1
10 TABLET ORAL DAILY
Qty: 30 TAB | Refills: 0 | Status: SHIPPED | OUTPATIENT
Start: 2018-09-27 | End: 2019-08-03

## 2018-09-27 NOTE — TELEPHONE ENCOUNTER
Patient reporting fullness to the ears right worse than left, OME noted left worse than right, She is taking Zyrtec daily and using nasal saline rinses and Flonase. Suggested start Sudafed and start Singulair for chronic allergies as she has had chronic complaints throughout all seasons.

## 2018-10-25 ENCOUNTER — DOCUMENTATION ONLY (OUTPATIENT)
Dept: INTERNAL MEDICINE CLINIC | Age: 38
End: 2018-10-25

## 2018-12-27 ENCOUNTER — TELEPHONE (OUTPATIENT)
Dept: INTERNAL MEDICINE CLINIC | Age: 38
End: 2018-12-27

## 2018-12-27 DIAGNOSIS — M62.830 MUSCLE SPASM OF BACK: Primary | ICD-10-CM

## 2018-12-27 RX ORDER — CYCLOBENZAPRINE HCL 10 MG
10 TABLET ORAL
Qty: 15 TAB | Refills: 0 | Status: SHIPPED | OUTPATIENT
Start: 2018-12-27 | End: 2019-08-03

## 2018-12-27 NOTE — TELEPHONE ENCOUNTER
She reports with right sided neck pain that came on abruptly after waking this morning with radiation of pain down the right arm. She reports with nausea if turns head,she feels related to the pain. She reports the pain is a spasm type of pain. No other consitutional symptoms. Examination reveals no muscle spasm on examination but with limited ROM to the neck due to pain. She is able to rotate the shoulder fully and lift arm with full ROM. Turning of neck increases pain. Sounds like a cervical radiculopathy possibly from poor sleep positioning. Recommended Anti-inflammatories, heat and will provide Flexeril. She is aware can increase sedation.

## 2019-02-01 ENCOUNTER — HOSPITAL ENCOUNTER (OUTPATIENT)
Dept: MAMMOGRAPHY | Age: 39
Discharge: HOME | End: 2019-02-01
Attending: OBSTETRICS & GYNECOLOGY
Payer: COMMERCIAL

## 2019-02-01 ENCOUNTER — HOSPITAL ENCOUNTER (OUTPATIENT)
Dept: ULTRASOUND IMAGING | Age: 39
Discharge: HOME | End: 2019-02-01
Attending: OBSTETRICS & GYNECOLOGY
Payer: COMMERCIAL

## 2019-02-01 DIAGNOSIS — N63.0 LUMP OR MASS IN BREAST: ICD-10-CM

## 2019-02-01 PROCEDURE — 77062 BREAST TOMOSYNTHESIS BI: CPT

## 2019-02-01 PROCEDURE — 76642 ULTRASOUND BREAST LIMITED: CPT

## 2019-06-05 ENCOUNTER — TELEPHONE (OUTPATIENT)
Dept: INTERNAL MEDICINE CLINIC | Age: 39
End: 2019-06-05

## 2019-06-05 DIAGNOSIS — F41.1 GENERALIZED ANXIETY DISORDER: ICD-10-CM

## 2019-06-05 DIAGNOSIS — Z86.59 HISTORY OF PANIC ATTACKS: ICD-10-CM

## 2019-06-05 RX ORDER — HYDROXYZINE HYDROCHLORIDE 10 MG/1
10 TABLET, FILM COATED ORAL
Qty: 30 TAB | Refills: 0 | Status: SHIPPED | OUTPATIENT
Start: 2019-06-05 | End: 2019-06-15

## 2019-06-05 RX ORDER — ALPRAZOLAM 0.25 MG/1
0.25 TABLET ORAL
Qty: 30 TAB | Refills: 0 | Status: SHIPPED | OUTPATIENT
Start: 2019-06-05 | End: 2019-08-03

## 2019-06-05 NOTE — TELEPHONE ENCOUNTER
I refilled Xanax and Atarax, we discussed trial of Atarax before returning to use of Xanax. She understands the side effects and risk of medications.

## 2019-08-03 ENCOUNTER — APPOINTMENT (OUTPATIENT)
Dept: CT IMAGING | Age: 39
End: 2019-08-03
Attending: EMERGENCY MEDICINE
Payer: COMMERCIAL

## 2019-08-03 ENCOUNTER — HOSPITAL ENCOUNTER (EMERGENCY)
Age: 39
Discharge: HOME | End: 2019-08-03
Attending: EMERGENCY MEDICINE | Admitting: EMERGENCY MEDICINE
Payer: COMMERCIAL

## 2019-08-03 VITALS
HEIGHT: 66 IN | RESPIRATION RATE: 17 BRPM | SYSTOLIC BLOOD PRESSURE: 112 MMHG | TEMPERATURE: 98.3 F | OXYGEN SATURATION: 100 % | WEIGHT: 140 LBS | HEART RATE: 73 BPM | DIASTOLIC BLOOD PRESSURE: 73 MMHG | BODY MASS INDEX: 22.5 KG/M2

## 2019-08-03 DIAGNOSIS — R11.0 NAUSEA: Primary | ICD-10-CM

## 2019-08-03 DIAGNOSIS — B34.9 VIRAL SYNDROME: ICD-10-CM

## 2019-08-03 LAB
ALBUMIN SERPL-MCNC: 4 G/DL (ref 3.4–5)
ALBUMIN/GLOB SERPL: 0.9 {RATIO} (ref 0.8–1.7)
ALP SERPL-CCNC: 86 U/L (ref 45–117)
ALT SERPL-CCNC: 23 U/L (ref 13–56)
ANION GAP SERPL CALC-SCNC: 8 MMOL/L (ref 3–18)
APPEARANCE UR: CLEAR
AST SERPL-CCNC: 15 U/L (ref 10–38)
BACTERIA URNS QL MICRO: ABNORMAL /HPF
BASOPHILS # BLD: 0 K/UL (ref 0–0.1)
BASOPHILS NFR BLD: 1 % (ref 0–2)
BILIRUB SERPL-MCNC: 0.5 MG/DL (ref 0.2–1)
BILIRUB UR QL: NEGATIVE
BUN SERPL-MCNC: 7 MG/DL (ref 7–18)
BUN/CREAT SERPL: 10 (ref 12–20)
CALCIUM SERPL-MCNC: 9 MG/DL (ref 8.5–10.1)
CHLORIDE SERPL-SCNC: 107 MMOL/L (ref 100–111)
CO2 SERPL-SCNC: 26 MMOL/L (ref 21–32)
COLOR UR: YELLOW
CREAT SERPL-MCNC: 0.7 MG/DL (ref 0.6–1.3)
DIFFERENTIAL METHOD BLD: ABNORMAL
EOSINOPHIL # BLD: 0.1 K/UL (ref 0–0.4)
EOSINOPHIL NFR BLD: 1 % (ref 0–5)
EPITH CASTS URNS QL MICRO: ABNORMAL /LPF (ref 0–5)
ERYTHROCYTE [DISTWIDTH] IN BLOOD BY AUTOMATED COUNT: 13.5 % (ref 11.6–14.5)
GLOBULIN SER CALC-MCNC: 4.7 G/DL (ref 2–4)
GLUCOSE SERPL-MCNC: 98 MG/DL (ref 74–99)
GLUCOSE UR STRIP.AUTO-MCNC: NEGATIVE MG/DL
HCG UR QL: NEGATIVE
HCT VFR BLD AUTO: 41.6 % (ref 35–45)
HGB BLD-MCNC: 13.8 G/DL (ref 12–16)
HGB UR QL STRIP: ABNORMAL
KETONES UR QL STRIP.AUTO: NEGATIVE MG/DL
LEUKOCYTE ESTERASE UR QL STRIP.AUTO: NEGATIVE
LIPASE SERPL-CCNC: 130 U/L (ref 73–393)
LYMPHOCYTES # BLD: 1.2 K/UL (ref 0.9–3.6)
LYMPHOCYTES NFR BLD: 15 % (ref 21–52)
MCH RBC QN AUTO: 26.1 PG (ref 24–34)
MCHC RBC AUTO-ENTMCNC: 33.2 G/DL (ref 31–37)
MCV RBC AUTO: 78.8 FL (ref 74–97)
MONOCYTES # BLD: 0.3 K/UL (ref 0.05–1.2)
MONOCYTES NFR BLD: 4 % (ref 3–10)
NEUTS SEG # BLD: 6.4 K/UL (ref 1.8–8)
NEUTS SEG NFR BLD: 79 % (ref 40–73)
NITRITE UR QL STRIP.AUTO: NEGATIVE
PH UR STRIP: 8.5 [PH] (ref 5–8)
PLATELET # BLD AUTO: 264 K/UL (ref 135–420)
PMV BLD AUTO: 10.2 FL (ref 9.2–11.8)
POTASSIUM SERPL-SCNC: 4 MMOL/L (ref 3.5–5.5)
PROT SERPL-MCNC: 8.7 G/DL (ref 6.4–8.2)
PROT UR STRIP-MCNC: NEGATIVE MG/DL
RBC # BLD AUTO: 5.28 M/UL (ref 4.2–5.3)
RBC #/AREA URNS HPF: ABNORMAL /HPF (ref 0–5)
SODIUM SERPL-SCNC: 141 MMOL/L (ref 136–145)
SP GR UR REFRACTOMETRY: 1.01 (ref 1–1.03)
UROBILINOGEN UR QL STRIP.AUTO: 0.2 EU/DL (ref 0.2–1)
WBC # BLD AUTO: 8.1 K/UL (ref 4.6–13.2)
WBC URNS QL MICRO: ABNORMAL /HPF (ref 0–4)

## 2019-08-03 PROCEDURE — 81001 URINALYSIS AUTO W/SCOPE: CPT

## 2019-08-03 PROCEDURE — 99283 EMERGENCY DEPT VISIT LOW MDM: CPT

## 2019-08-03 PROCEDURE — 74011250636 HC RX REV CODE- 250/636: Performed by: EMERGENCY MEDICINE

## 2019-08-03 PROCEDURE — 74177 CT ABD & PELVIS W/CONTRAST: CPT

## 2019-08-03 PROCEDURE — 83690 ASSAY OF LIPASE: CPT

## 2019-08-03 PROCEDURE — 74011636320 HC RX REV CODE- 636/320: Performed by: EMERGENCY MEDICINE

## 2019-08-03 PROCEDURE — 96376 TX/PRO/DX INJ SAME DRUG ADON: CPT

## 2019-08-03 PROCEDURE — 80053 COMPREHEN METABOLIC PANEL: CPT

## 2019-08-03 PROCEDURE — 85025 COMPLETE CBC W/AUTO DIFF WBC: CPT

## 2019-08-03 PROCEDURE — 96375 TX/PRO/DX INJ NEW DRUG ADDON: CPT

## 2019-08-03 PROCEDURE — 96374 THER/PROPH/DIAG INJ IV PUSH: CPT

## 2019-08-03 PROCEDURE — 96361 HYDRATE IV INFUSION ADD-ON: CPT

## 2019-08-03 PROCEDURE — 81025 URINE PREGNANCY TEST: CPT

## 2019-08-03 RX ORDER — MORPHINE SULFATE 2 MG/ML
2 INJECTION, SOLUTION INTRAMUSCULAR; INTRAVENOUS
Status: COMPLETED | OUTPATIENT
Start: 2019-08-03 | End: 2019-08-03

## 2019-08-03 RX ORDER — ONDANSETRON 2 MG/ML
4 INJECTION INTRAMUSCULAR; INTRAVENOUS
Status: COMPLETED | OUTPATIENT
Start: 2019-08-03 | End: 2019-08-03

## 2019-08-03 RX ORDER — ONDANSETRON 8 MG/1
8 TABLET, ORALLY DISINTEGRATING ORAL
Qty: 12 TAB | Refills: 0 | Status: SHIPPED | OUTPATIENT
Start: 2019-08-03

## 2019-08-03 RX ADMIN — SODIUM CHLORIDE 1000 ML: 900 INJECTION, SOLUTION INTRAVENOUS at 06:31

## 2019-08-03 RX ADMIN — ONDANSETRON 4 MG: 2 INJECTION INTRAMUSCULAR; INTRAVENOUS at 06:31

## 2019-08-03 RX ADMIN — IOPAMIDOL 100 ML: 612 INJECTION, SOLUTION INTRAVENOUS at 07:03

## 2019-08-03 RX ADMIN — MORPHINE SULFATE 2 MG: 2 INJECTION, SOLUTION INTRAMUSCULAR; INTRAVENOUS at 06:31

## 2019-08-03 RX ADMIN — ONDANSETRON 4 MG: 2 INJECTION INTRAMUSCULAR; INTRAVENOUS at 08:09

## 2019-08-03 NOTE — ED NOTES
Assumed care of patient. Resting comfortably on stretcher with IV fluids infusing via gravity. Emesis bag up to mouth, states, \"my nausea is starting to come back\". Dr. Delmi Adams in room also and aware. Patient states she would rather not have any more medicine as of now, just to \"wait it out\".  at bedside. Will continue to monitor closely while awaiting further orders.

## 2019-08-03 NOTE — ED NOTES
Assumed care from the patient at 7:15 AM by Dr. Lyudmila Valdez. Patient presented with nausea and is concerned that she may have eaten something bad. Labs are reviewed and reassuring and are awaiting CT of the abdomen pelvis. Offered increasing supportive care however patient  does not want any more nausea and pain medication at this time. Tatiana Palencia, DO 7:40 AM    Patient is improving is right to go home. CT is reassuring and suggestive of a full stomach without obstructive process with a soft abdomen which is likely related to a viral process and will give supportive care and have her follow closely with her primary doctor and was educated on signs of worsening. Patient will return if at all worsened or concerned. Workup and recommendations were reviewed with the patient and all questions were answered. The patient understands the plan and will proceed with close outpatient care. I have encouraged the patient to return if at all worsened or concerned.    Tatiana Palencia, DO 8:51 AM

## 2019-08-03 NOTE — ED NOTES
Patient states she is no longer nauseous and ready to go. When approached for discharge, patient said she felt dizzy and started vomiting in emesis bag. Large amount noted. Dr. Pablo Cotter in room also. Recommends letting patient rest. Will continue to monitor closely.  at side.

## 2019-08-03 NOTE — DISCHARGE INSTRUCTIONS
Patient Education        Nausea and Vomiting: Care Instructions  Your Care Instructions    When you are nauseated, you may feel weak and sweaty and notice a lot of saliva in your mouth. Nausea often leads to vomiting. Most of the time you do not need to worry about nausea and vomiting, but they can be signs of other illnesses. Two common causes of nausea and vomiting are stomach flu and food poisoning. Nausea and vomiting from viral stomach flu will usually start to improve within 24 hours. Nausea and vomiting from food poisoning may last from 12 to 48 hours. The doctor has checked you carefully, but problems can develop later. If you notice any problems or new symptoms, get medical treatment right away. Follow-up care is a key part of your treatment and safety. Be sure to make and go to all appointments, and call your doctor if you are having problems. It's also a good idea to know your test results and keep a list of the medicines you take. How can you care for yourself at home? · To prevent dehydration, drink plenty of fluids, enough so that your urine is light yellow or clear like water. Choose water and other caffeine-free clear liquids until you feel better. If you have kidney, heart, or liver disease and have to limit fluids, talk with your doctor before you increase the amount of fluids you drink. · Rest in bed until you feel better. · When you are able to eat, try clear soups, mild foods, and liquids until all symptoms are gone for 12 to 48 hours. Other good choices include dry toast, crackers, cooked cereal, and gelatin dessert, such as Jell-O. When should you call for help? Call 911 anytime you think you may need emergency care. For example, call if:    · You passed out (lost consciousness).    Call your doctor now or seek immediate medical care if:    · You have symptoms of dehydration, such as:  ? Dry eyes and a dry mouth. ? Passing only a little dark urine. ?  Feeling thirstier than usual.   · You have new or worsening belly pain.     · You have a new or higher fever.     · You vomit blood or what looks like coffee grounds.    Watch closely for changes in your health, and be sure to contact your doctor if:    · You have ongoing nausea and vomiting.     · Your vomiting is getting worse.     · Your vomiting lasts longer than 2 days.     · You are not getting better as expected. Where can you learn more? Go to http://carlos manuel-joan.info/. Enter 25 [id number] in the search box to learn more about \"Nausea and Vomiting: Care Instructions. \"  Current as of: September 23, 2018  Content Version: 12.1  © 2664-4184 Healthwise, [employer]. Care instructions adapted under license by [employer] (which disclaims liability or warranty for this information). If you have questions about a medical condition or this instruction, always ask your healthcare professional. [address] any warranty or liability for your use of this information.

## 2019-08-03 NOTE — ED PROVIDER NOTES
HPI patient is a 82-year-old female who ate dinner this evening. About 1 to 2 hours later she developed severe crampy abdominal pain. No vomiting or diarrhea. Symptoms have been present x 2 to 3 hours. History reviewed. No pertinent past medical history. History reviewed. No pertinent surgical history.       Family History:   Problem Relation Age of Onset    Elevated Lipids Mother     Hypertension Mother     Heart Disease Father     Heart Disease Paternal Uncle     Diabetes Maternal Grandmother     Elevated Lipids Maternal Grandmother     Hypertension Maternal Grandmother     Diabetes Maternal Grandfather     Elevated Lipids Maternal Grandfather     Hypertension Maternal Grandfather     Heart Disease Maternal Grandfather        Social History     Socioeconomic History    Marital status:      Spouse name: Not on file    Number of children: Not on file    Years of education: Not on file    Highest education level: Not on file   Occupational History    Not on file   Social Needs    Financial resource strain: Not on file    Food insecurity:     Worry: Not on file     Inability: Not on file    Transportation needs:     Medical: Not on file     Non-medical: Not on file   Tobacco Use    Smoking status: Never Smoker    Smokeless tobacco: Never Used   Substance and Sexual Activity    Alcohol use: Yes     Comment: socially    Drug use: No    Sexual activity: Yes     Partners: Male     Birth control/protection: None   Lifestyle    Physical activity:     Days per week: Not on file     Minutes per session: Not on file    Stress: Not on file   Relationships    Social connections:     Talks on phone: Not on file     Gets together: Not on file     Attends Anabaptist service: Not on file     Active member of club or organization: Not on file     Attends meetings of clubs or organizations: Not on file     Relationship status: Not on file    Intimate partner violence:     Fear of current or ex partner: Not on file     Emotionally abused: Not on file     Physically abused: Not on file     Forced sexual activity: Not on file   Other Topics Concern    Not on file   Social History Narrative    Not on file         ALLERGIES: Demerol [meperidine] and Pcn [penicillins]    Review of Systems   Constitutional: Negative. HENT: Negative. Eyes: Negative. Respiratory: Negative. Cardiovascular: Negative. Gastrointestinal: Negative. Negative for abdominal distention. Endocrine: Negative. Genitourinary: Negative. Negative for difficulty urinating, dysuria and flank pain. Musculoskeletal: Negative. Skin: Negative. Allergic/Immunologic: Negative. Neurological: Negative. Hematological: Negative. Psychiatric/Behavioral: Negative. All other systems reviewed and are negative. Vitals:    08/03/19 0602   BP: (!) 146/98   Pulse: 90   Resp: 18   Temp: 98.3 °F (36.8 °C)   SpO2: 98%   Weight: 63.5 kg (140 lb)   Height: 5' 6\" (1.676 m)            Physical Exam   Constitutional: She is oriented to person, place, and time. She appears well-developed and well-nourished. No distress. HENT:   Head: Normocephalic. Right Ear: External ear normal.   Left Ear: External ear normal.   Mouth/Throat: No oropharyngeal exudate. Eyes: Pupils are equal, round, and reactive to light. Conjunctivae and EOM are normal. Right eye exhibits no discharge. Left eye exhibits no discharge. No scleral icterus. Neck: Normal range of motion. Neck supple. No JVD present. No tracheal deviation present. No thyromegaly present. Cardiovascular: Normal rate, regular rhythm, normal heart sounds and intact distal pulses. Exam reveals no gallop and no friction rub. No murmur heard. Pulmonary/Chest: Effort normal and breath sounds normal. No stridor. No respiratory distress. She has no wheezes. She has no rales. She exhibits no tenderness. Abdominal: Soft.  Bowel sounds are normal. She exhibits no distension and no mass. There is no tenderness. There is no rebound, no guarding and negative Venegas's sign. No hernia. Musculoskeletal: Normal range of motion. She exhibits no edema or tenderness. Lymphadenopathy:     She has no cervical adenopathy. Neurological: She is alert and oriented to person, place, and time. She displays normal reflexes. No cranial nerve deficit. She exhibits normal muscle tone. Coordination normal.   Skin: Skin is warm and dry. No rash noted. She is not diaphoretic. No erythema. No pallor. Nursing note and vitals reviewed. MDM     Differential diagnosis: Gastritis, food poisoning, gastroenteritis, cholecystitis, diverticulitis, colitis, volvulus, ectopic pregnancy, UTI,    7:05 AM : Pt care transferred to Dr. Izzy Loco, ED provider. History of patient complaint(s), available diagnostic reports and current treatment plan has been discussed thoroughly. Bedside rounding on patient occurred: yes  Intended disposition of patient: discharge     Dictation disclaimer:  Please note that this dictation was completed with [employer], the computer voice recognition software. Quite often unanticipated grammatical, syntax, homophones, and other interpretive errors are inadvertently transcribed by the computer software. Please disregard these errors. Please excuse any errors that have escaped final proofreading.

## 2019-11-21 ENCOUNTER — TELEPHONE (OUTPATIENT)
Dept: INTERNAL MEDICINE CLINIC | Age: 39
End: 2019-11-21

## 2019-11-21 DIAGNOSIS — Z00.00 PHYSICAL EXAM: Primary | ICD-10-CM

## 2019-11-22 ENCOUNTER — CLINICAL SUPPORT (OUTPATIENT)
Dept: INTERNAL MEDICINE CLINIC | Age: 39
End: 2019-11-22

## 2019-11-22 DIAGNOSIS — Z11.1 SCREENING-PULMONARY TB: Primary | ICD-10-CM

## 2019-11-22 NOTE — PROGRESS NOTES
PPD Placement note  Haylie Green, 42 y.o. female is here today for placement of PPD test  Reason for PPD test: School  Pt taken PPD test before: yes  Verified in allergy area and with patient that they are not allergic to the products PPD is made of (Phenol or Tween). Yes  Is patient taking any oral or IV steroid medication now or have they taken it in the last month? no  Has the patient ever received the BCG vaccine?: no  Has the patient been in recent contact with anyone known or suspected of having active TB disease?: no      O: Alert and oriented in NAD. P:  PPD placed on 11/22/2019 in left forearm. Patient advised to return for reading within 48-72 hours.     Tuberculin 0.1 ml Spredfastofi  LOT [id number]  Exp 3-25-22  ndc [id number]

## 2019-11-22 NOTE — PATIENT INSTRUCTIONS
Tuberculin Skin Test: Care Instructions  Your Care Instructions    Tuberculosis (TB) is a bacterial infection that can damage the lungs or other parts of the body. The TB skin test can tell if you have TB bacteria in your body. Many people are exposed to TB and test positive for TB bacteria in their bodies, but they don't get the disease. TB bacteria can stay in your body without making you sick. This is because your immune system can keep TB in check. Your doctor may want you to have a TB skin test if you have been in close contact with someone who has TB. Or you may need the test if you have symptoms that might be caused by TB, such as a cough that does not go away, a fever, or weight loss. You also may get the test if you are a health care worker. During the skin test, part of a TB bacterium is injected under your skin. The test will feel like a skin prick. If you have TB bacteria in your body, a firm red bump will form at the shot site within 2 days. If the test shows that you are infected with TB (positive), your doctor probably will order more tests. A TB-positive skin test can't tell when you became infected with TB. And it can't tell whether the infection can be passed to others. Follow-up care is a key part of your treatment and safety. Be sure to make and go to all appointments, and call your doctor if you are having problems. It's also a good idea to know your test results and keep a list of the medicines you take. How can you care for yourself at home? · Do not scratch the test site. Scratching it may cause redness or swelling. This could affect the test results. · To ease itching, put a cold washcloth on the site. Then pat the site dry. · Do not cover the test site with a bandage or other dressing. · Go back to your doctor's office or hospital to have the test read on the follow-up date. This must be done between 48 and 72 hours after you get the shot. When should you call for help?   Watch closely for changes in your health, and be sure to contact your doctor if you have any problems. Where can you learn more? Go to http://carlos manuel-joan.info/. Enter ([phone] in the search box to learn more about \"Tuberculin Skin Test: Care Instructions. \"  Current as of: June 9, 2019  Content Version: 12.2  © 8240-4648 [employer]. Care instructions adapted under license by [employer] (which disclaims liability or warranty for this information). If you have questions about a medical condition or this instruction, always ask your healthcare professional. [address] any warranty or liability for your use of this information.

## 2019-11-25 ENCOUNTER — CLINICAL SUPPORT (OUTPATIENT)
Dept: INTERNAL MEDICINE CLINIC | Age: 39
End: 2019-11-25

## 2019-11-25 DIAGNOSIS — Z11.1 ENCOUNTER FOR PPD SKIN TEST READING: Primary | ICD-10-CM

## 2019-11-25 NOTE — PROGRESS NOTES
PPD Reading Note  [facility] Result: 0 mm induration.   Interpretation: negative

## 2020-06-06 ENCOUNTER — HOSPITAL ENCOUNTER (OUTPATIENT)
Dept: MAMMOGRAPHY | Age: 40
Discharge: HOME | End: 2020-06-06
Attending: OBSTETRICS & GYNECOLOGY
Payer: COMMERCIAL

## 2020-06-06 DIAGNOSIS — Z12.31 SCREENING MAMMOGRAM, ENCOUNTER FOR: ICD-10-CM

## 2020-06-06 PROCEDURE — 77063 BREAST TOMOSYNTHESIS BI: CPT

## 2022-03-07 ENCOUNTER — TRANSCRIBE ORDER (OUTPATIENT)
Dept: SCHEDULING | Age: 42
End: 2022-03-07

## 2022-03-07 DIAGNOSIS — Z12.31 VISIT FOR SCREENING MAMMOGRAM: Primary | ICD-10-CM

## 2022-03-18 PROBLEM — F41.1 GENERALIZED ANXIETY DISORDER: Status: ACTIVE | Noted: 2017-12-07

## 2022-03-26 ENCOUNTER — HOSPITAL ENCOUNTER (OUTPATIENT)
Dept: MAMMOGRAPHY | Age: 42
Discharge: HOME | End: 2022-03-26
Attending: OBSTETRICS & GYNECOLOGY
Payer: COMMERCIAL

## 2022-03-26 DIAGNOSIS — Z12.31 VISIT FOR SCREENING MAMMOGRAM: ICD-10-CM

## 2022-03-26 PROCEDURE — 77063 BREAST TOMOSYNTHESIS BI: CPT

## 2022-04-26 ENCOUNTER — TRANSCRIBE ORDER (OUTPATIENT)
Dept: SCHEDULING | Age: 42
End: 2022-04-26

## 2022-04-26 DIAGNOSIS — R51.9 HEAD ACHE: ICD-10-CM

## 2022-04-26 DIAGNOSIS — R56.9 SEIZURE (HCC): Primary | ICD-10-CM

## 2022-05-23 ENCOUNTER — HOSPITAL ENCOUNTER (OUTPATIENT)
Dept: MRI IMAGING | Age: 42
Discharge: HOME | End: 2022-05-23
Attending: PHYSICIAN ASSISTANT
Payer: COMMERCIAL

## 2022-05-23 ENCOUNTER — HOSPITAL ENCOUNTER (OUTPATIENT)
Dept: NEUROLOGY | Age: 42
Discharge: HOME | End: 2022-05-23
Attending: PHYSICIAN ASSISTANT
Payer: COMMERCIAL

## 2022-05-23 DIAGNOSIS — R51.9 HEAD ACHE: ICD-10-CM

## 2022-05-23 DIAGNOSIS — R56.9 SEIZURE (HCC): ICD-10-CM

## 2022-05-23 PROCEDURE — A9577 INJ MULTIHANCE: HCPCS

## 2022-05-23 PROCEDURE — 74011250636 HC RX REV CODE- 250/636

## 2022-05-23 PROCEDURE — 95816 EEG AWAKE AND DROWSY: CPT

## 2022-05-23 PROCEDURE — 70553 MRI BRAIN STEM W/O & W/DYE: CPT

## 2022-05-23 RX ADMIN — GADOBENATE DIMEGLUMINE 10 ML: 529 INJECTION, SOLUTION INTRAVENOUS at 13:43

## 2022-06-15 NOTE — PROCEDURES
[address] [place] [address]   EEG    Name:  Aguila Verdugo  MR#:   [id number]  :  1980  ACCOUNT #:  [de-identified]  DATE OF SERVICE:  2022    OUTPATIENT RECORDING    EEG Number:  MV EEG     ORDERING PROVIDER:  Jessica Mondragon NP    REASON FOR EEG:  Evaluation of seizures. MEDICATIONS:  The patient's medications at the time of recording include cetirizine and vitamin D.    EEG TECHNIQUE USED:  Standard 10-20 system with 16-channel recording and an EKG. Activation procedure used was photic stimulation. Total duration of the recording was 27 minutes. This is mainly an awake EEG recording. EEG REPORT:  The background consists of posterior-dominant alpha rhythms at a frequency of 10 Hz and they attenuate to eye opening. No significant asymmetry between the right and left sides. There are no obvious spikes or sharp wave discharges. No focal slowing. Photic stimulation did not induce any abnormal discharges. IMPRESSION:  This is basically a normal EEG recording. CLINICAL CORRELATION:  Normal EEG does not rule out the possibility of seizures or epilepsy.       MD CLINTON [email or web address]  D:  2022 17:55  T:  06/15/2022 4:28  JOB #:  [id number]

## 2022-10-08 ENCOUNTER — HOSPITAL ENCOUNTER (OUTPATIENT)
Dept: LAB | Age: 42
Discharge: HOME | End: 2022-10-08
Payer: COMMERCIAL

## 2022-10-08 LAB
ALBUMIN SERPL-MCNC: 3.8 G/DL (ref 3.4–5)
ALBUMIN/GLOB SERPL: 1 {RATIO} (ref 0.8–1.7)
ALP SERPL-CCNC: 57 U/L (ref 45–117)
ALT SERPL-CCNC: 16 U/L (ref 13–56)
ANION GAP SERPL CALC-SCNC: 6 MMOL/L (ref 3–18)
AST SERPL-CCNC: 14 U/L (ref 10–38)
BASOPHILS # BLD: 0 K/UL (ref 0–0.1)
BASOPHILS NFR BLD: 1 % (ref 0–2)
BILIRUB SERPL-MCNC: 1.4 MG/DL (ref 0.2–1)
BUN SERPL-MCNC: 6 MG/DL (ref 7–18)
BUN/CREAT SERPL: 7 (ref 12–20)
CALCIUM SERPL-MCNC: 9.2 MG/DL (ref 8.5–10.1)
CHLORIDE SERPL-SCNC: 105 MMOL/L (ref 100–111)
CHOLEST SERPL-MCNC: 161 MG/DL
CO2 SERPL-SCNC: 25 MMOL/L (ref 21–32)
CREAT SERPL-MCNC: 0.86 MG/DL (ref 0.6–1.3)
DIFFERENTIAL METHOD BLD: NORMAL
EOSINOPHIL # BLD: 0.1 K/UL (ref 0–0.4)
EOSINOPHIL NFR BLD: 1 % (ref 0–5)
ERYTHROCYTE [DISTWIDTH] IN BLOOD BY AUTOMATED COUNT: 13.7 % (ref 11.6–14.5)
GLOBULIN SER CALC-MCNC: 3.9 G/DL (ref 2–4)
GLUCOSE SERPL-MCNC: 92 MG/DL (ref 74–99)
HCT VFR BLD AUTO: 42.3 % (ref 35–45)
HDLC SERPL-MCNC: 75 MG/DL (ref 40–60)
HDLC SERPL: 2.1 {RATIO} (ref 0–5)
HGB BLD-MCNC: 13.5 G/DL (ref 12–16)
IMM GRANULOCYTES # BLD AUTO: 0 K/UL (ref 0–0.04)
IMM GRANULOCYTES NFR BLD AUTO: 0 % (ref 0–0.5)
LDLC SERPL CALC-MCNC: 74 MG/DL (ref 0–100)
LIPID PROFILE,FLP: ABNORMAL
LYMPHOCYTES # BLD: 1.9 K/UL (ref 0.9–3.6)
LYMPHOCYTES NFR BLD: 25 % (ref 21–52)
MCH RBC QN AUTO: 26 PG (ref 24–34)
MCHC RBC AUTO-ENTMCNC: 31.9 G/DL (ref 31–37)
MCV RBC AUTO: 81.3 FL (ref 78–100)
MONOCYTES # BLD: 0.5 K/UL (ref 0.05–1.2)
MONOCYTES NFR BLD: 7 % (ref 3–10)
NEUTS SEG # BLD: 5 K/UL (ref 1.8–8)
NEUTS SEG NFR BLD: 67 % (ref 40–73)
NRBC # BLD: 0 K/UL (ref 0–0.01)
NRBC BLD-RTO: 0 PER 100 WBC
PLATELET # BLD AUTO: 240 K/UL (ref 135–420)
PMV BLD AUTO: 11.8 FL (ref 9.2–11.8)
POTASSIUM SERPL-SCNC: 4.4 MMOL/L (ref 3.5–5.5)
PROT SERPL-MCNC: 7.7 G/DL (ref 6.4–8.2)
RBC # BLD AUTO: 5.2 M/UL (ref 4.2–5.3)
SODIUM SERPL-SCNC: 136 MMOL/L (ref 136–145)
TRIGL SERPL-MCNC: 60 MG/DL (ref ?–150)
VLDLC SERPL CALC-MCNC: 12 MG/DL
WBC # BLD AUTO: 7.5 K/UL (ref 4.6–13.2)

## 2022-10-08 PROCEDURE — 80061 LIPID PANEL: CPT

## 2022-10-08 PROCEDURE — 80053 COMPREHEN METABOLIC PANEL: CPT

## 2022-10-08 PROCEDURE — 85025 COMPLETE CBC W/AUTO DIFF WBC: CPT

## 2022-10-08 PROCEDURE — 82306 VITAMIN D 25 HYDROXY: CPT

## 2022-10-08 PROCEDURE — 36415 COLL VENOUS BLD VENIPUNCTURE: CPT

## 2022-10-09 LAB — 25(OH)D3 SERPL-MCNC: 36.1 NG/ML (ref 30–100)

## 2023-07-08 ENCOUNTER — HOSPITAL ENCOUNTER (OUTPATIENT)
Facility: HOSPITAL | Age: 43
End: 2023-07-08
Payer: COMMERCIAL

## 2023-07-08 DIAGNOSIS — Z12.31 VISIT FOR SCREENING MAMMOGRAM: ICD-10-CM

## 2023-07-08 PROCEDURE — 77063 BREAST TOMOSYNTHESIS BI: CPT

## 2024-07-17 ENCOUNTER — TRANSCRIBE ORDERS (OUTPATIENT)
Facility: HOSPITAL | Age: 44
End: 2024-07-17

## 2024-07-17 DIAGNOSIS — Z12.31 SCREENING MAMMOGRAM FOR HIGH-RISK PATIENT: Primary | ICD-10-CM

## 2024-09-07 ENCOUNTER — HOSPITAL ENCOUNTER (OUTPATIENT)
Facility: HOSPITAL | Age: 44
Discharge: HOME | End: 2024-09-10
Payer: COMMERCIAL

## 2024-09-07 DIAGNOSIS — Z12.31 SCREENING MAMMOGRAM FOR HIGH-RISK PATIENT: ICD-10-CM

## 2024-09-07 PROCEDURE — 77063 BREAST TOMOSYNTHESIS BI: CPT

## 2025-07-10 ENCOUNTER — HOSPITAL ENCOUNTER (OUTPATIENT)
Age: 45
Discharge: HOME | End: 2025-07-13
Payer: COMMERCIAL

## 2025-07-10 DIAGNOSIS — M25.551 BILATERAL HIP PAIN: ICD-10-CM

## 2025-07-10 DIAGNOSIS — M25.552 BILATERAL HIP PAIN: ICD-10-CM

## 2025-07-10 PROCEDURE — 73522 X-RAY EXAM HIPS BI 3-4 VIEWS: CPT
